# Patient Record
Sex: FEMALE | Race: OTHER | Employment: UNEMPLOYED | URBAN - METROPOLITAN AREA
[De-identification: names, ages, dates, MRNs, and addresses within clinical notes are randomized per-mention and may not be internally consistent; named-entity substitution may affect disease eponyms.]

---

## 2019-04-02 ENCOUNTER — TELEPHONE (OUTPATIENT)
Dept: OBGYN CLINIC | Facility: CLINIC | Age: 29
End: 2019-04-02

## 2019-04-02 NOTE — TELEPHONE ENCOUNTER
Per Mary he called requesting apt for pt. Stated they havent started care yet. LMP 01/27/2019 ([employer] [id number])  Live in [place], but they temporarily relocated here for his job.  They are expecting to be here for about 1 or 2 more months then will return

## 2019-04-02 NOTE — TELEPHONE ENCOUNTER
Pt received +HPT, LMP- unknown. Stated [place] be in [place] about a month and then go back to [place] where she lives, but wants to be seen for the month. Patient has never been seen at [facility] before.   Pls adv further

## 2019-04-03 ENCOUNTER — OFFICE VISIT (OUTPATIENT)
Dept: OBGYN CLINIC | Facility: CLINIC | Age: 29
End: 2019-04-03
Payer: COMMERCIAL

## 2019-04-03 VITALS — WEIGHT: 156 LBS | SYSTOLIC BLOOD PRESSURE: 116 MMHG | HEART RATE: 93 BPM | DIASTOLIC BLOOD PRESSURE: 78 MMHG

## 2019-04-03 DIAGNOSIS — Z32.01 PREGNANCY EXAMINATION OR TEST, POSITIVE RESULT: Primary | ICD-10-CM

## 2019-04-03 DIAGNOSIS — N92.6 MISSED MENSES: ICD-10-CM

## 2019-04-03 PROCEDURE — 99202 OFFICE O/P NEW SF 15 MIN: CPT | Performed by: OBSTETRICS & GYNECOLOGY

## 2019-04-03 PROCEDURE — 81025 URINE PREGNANCY TEST: CPT | Performed by: OBSTETRICS & GYNECOLOGY

## 2019-04-03 NOTE — PROGRESS NOTES
[facility], [facility]  Obstetrics and Gynecology  Pregnancy Confirmation  MD SANDY Timmons     Bernard Cai is a 30year old  with Patient's last menstrual period was 2019 (exact date). who presents for pregnancy confirmation. Occupational History      Not on file    Social Needs      Financial resource strain: Not on file      Food insecurity:        Worry: Not on file        Inability: Not on file      Transportation needs:        Medical: Not on file        Non-medical: Not o visit. Discussed flu vaccine recommendations and that patient should not be concerned about flu vaccine that she received.   Discussedt with patient and her  that varicella vaccine is a live virus vaccine and not recommended during pregnancy or with

## 2019-04-10 ENCOUNTER — TELEPHONE (OUTPATIENT)
Dept: PERINATAL CARE | Facility: HOSPITAL | Age: 29
End: 2019-04-10

## 2019-04-10 ENCOUNTER — OFFICE VISIT (OUTPATIENT)
Dept: OBGYN CLINIC | Facility: CLINIC | Age: 29
End: 2019-04-10
Payer: COMMERCIAL

## 2019-04-10 ENCOUNTER — TELEPHONE (OUTPATIENT)
Dept: OBGYN CLINIC | Facility: CLINIC | Age: 29
End: 2019-04-10

## 2019-04-10 VITALS — WEIGHT: 155 LBS | SYSTOLIC BLOOD PRESSURE: 114 MMHG | HEART RATE: 81 BPM | DIASTOLIC BLOOD PRESSURE: 71 MMHG

## 2019-04-10 DIAGNOSIS — Z32.01 PREGNANCY EXAMINATION OR TEST, POSITIVE RESULT: Primary | ICD-10-CM

## 2019-04-10 PROCEDURE — 76817 TRANSVAGINAL US OBSTETRIC: CPT | Performed by: OBSTETRICS & GYNECOLOGY

## 2019-04-10 PROCEDURE — 99213 OFFICE O/P EST LOW 20 MIN: CPT | Performed by: OBSTETRICS & GYNECOLOGY

## 2019-04-10 RX ORDER — PRENATAL VIT/IRON FUM/FOLIC AC 27MG-0.8MG
1 TABLET ORAL DAILY
COMMUNITY

## 2019-04-10 NOTE — PROGRESS NOTES
[facility], [facility]  Obstetrics and Gynecology  Pregnancy Confirmation  Bijan Arechiga MD    HPI     Sean  is a 30year old  with No LMP recorded. who presents for pregnancy confirmation.      Patient had a positive pregnancy test on  Minutes per session: Not on file      Stress: Not on file    Relationships      Social connections:        Talks on phone: Not on file        Gets together: Not on file        Attends Muslim service: Not on file        Active member of club or organizat counseling the patient and/or coordinating care.   Linda Metzger MD, MD  3:10 PM  4/10/2019

## 2019-04-16 ENCOUNTER — LAB ENCOUNTER (OUTPATIENT)
Dept: LAB | Facility: HOSPITAL | Age: 29
End: 2019-04-16
Attending: OBSTETRICS & GYNECOLOGY
Payer: COMMERCIAL

## 2019-04-16 ENCOUNTER — NURSE ONLY (OUTPATIENT)
Dept: OBGYN CLINIC | Facility: CLINIC | Age: 29
End: 2019-04-16
Payer: COMMERCIAL

## 2019-04-16 VITALS — BODY MASS INDEX: 29 KG/M2 | HEIGHT: 61 IN

## 2019-04-16 DIAGNOSIS — Z34.01 ENCOUNTER FOR SUPERVISION OF NORMAL FIRST PREGNANCY IN FIRST TRIMESTER: Primary | ICD-10-CM

## 2019-04-16 DIAGNOSIS — Z34.01 ENCOUNTER FOR SUPERVISION OF NORMAL FIRST PREGNANCY IN FIRST TRIMESTER: ICD-10-CM

## 2019-04-16 PROCEDURE — 36415 COLL VENOUS BLD VENIPUNCTURE: CPT

## 2019-04-16 PROCEDURE — 86901 BLOOD TYPING SEROLOGIC RH(D): CPT

## 2019-04-16 PROCEDURE — 86900 BLOOD TYPING SEROLOGIC ABO: CPT

## 2019-04-16 PROCEDURE — 87340 HEPATITIS B SURFACE AG IA: CPT

## 2019-04-16 PROCEDURE — 99211 OFF/OP EST MAY X REQ PHY/QHP: CPT | Performed by: OBSTETRICS & GYNECOLOGY

## 2019-04-16 PROCEDURE — 86850 RBC ANTIBODY SCREEN: CPT

## 2019-04-16 PROCEDURE — 87086 URINE CULTURE/COLONY COUNT: CPT

## 2019-04-16 PROCEDURE — 86762 RUBELLA ANTIBODY: CPT

## 2019-04-16 PROCEDURE — 85025 COMPLETE CBC W/AUTO DIFF WBC: CPT

## 2019-04-16 PROCEDURE — 87389 HIV-1 AG W/HIV-1&-2 AB AG IA: CPT

## 2019-04-16 PROCEDURE — 86780 TREPONEMA PALLIDUM: CPT

## 2019-04-16 PROCEDURE — 81001 URINALYSIS AUTO W/SCOPE: CPT

## 2019-04-16 NOTE — PROGRESS NOTES
OB History     T0    L0    SAB0  TAB0  Ectopic0  Multiple0  Live Births0       Pt is here today with her  for RN [facility] Education.  Educational material reviewed with patient: Prenatal care, nutrition, weight gain recommendations, travel, exe

## 2019-04-25 ENCOUNTER — HOSPITAL ENCOUNTER (OUTPATIENT)
Dept: PERINATAL CARE | Facility: HOSPITAL | Age: 29
Discharge: HOME | End: 2019-04-25
Attending: OBSTETRICS & GYNECOLOGY
Payer: COMMERCIAL

## 2019-04-25 VITALS
DIASTOLIC BLOOD PRESSURE: 81 MMHG | SYSTOLIC BLOOD PRESSURE: 128 MMHG | HEART RATE: 95 BPM | HEIGHT: 61 IN | WEIGHT: 139 LBS | BODY MASS INDEX: 26.24 KG/M2

## 2019-04-25 DIAGNOSIS — Z36.9 FIRST TRIMESTER SCREENING: ICD-10-CM

## 2019-04-25 DIAGNOSIS — Z36.9 FIRST TRIMESTER SCREENING: Primary | ICD-10-CM

## 2019-04-25 PROCEDURE — 76813 OB US NUCHAL MEAS 1 GEST: CPT | Performed by: OBSTETRICS & GYNECOLOGY

## 2019-04-25 NOTE — PROGRESS NOTES
See imaging tab for complete ultrasound report or in PACS      FIRST TRIMESTER SCREENING:    The CRL is consistent with the gestational age. The nuchal translucency measures   1.9  mm. This is within normal limits. The nasal bone is present.   Fetus: arms

## 2019-04-29 ENCOUNTER — TELEPHONE (OUTPATIENT)
Dept: PERINATAL CARE | Facility: HOSPITAL | Age: 29
End: 2019-04-29

## 2019-04-29 DIAGNOSIS — Z36.9 FIRST TRIMESTER SCREENING: ICD-10-CM

## 2019-04-29 NOTE — TELEPHONE ENCOUNTER
Recd FTS results for Rohit Wyatt and Dr Kike Read reviewed and signed off. Spoke with Daniella spouse per her request and informed him that the risk after screening for Trisomy 13,18 and 21 is 1 in >L 10,000. These results are within range.   Spouse

## 2019-05-13 ENCOUNTER — INITIAL PRENATAL (OUTPATIENT)
Dept: OBGYN CLINIC | Facility: CLINIC | Age: 29
End: 2019-05-13
Payer: COMMERCIAL

## 2019-05-13 VITALS — WEIGHT: 163.88 LBS | BODY MASS INDEX: 31 KG/M2 | DIASTOLIC BLOOD PRESSURE: 68 MMHG | SYSTOLIC BLOOD PRESSURE: 118 MMHG

## 2019-05-13 DIAGNOSIS — Z34.82 ENCOUNTER FOR SUPERVISION OF OTHER NORMAL PREGNANCY IN SECOND TRIMESTER: Primary | ICD-10-CM

## 2019-05-13 PROBLEM — Z36.9 FIRST TRIMESTER SCREENING: Status: RESOLVED | Noted: 2019-04-25 | Resolved: 2019-05-13

## 2019-05-13 PROCEDURE — 81002 URINALYSIS NONAUTO W/O SCOPE: CPT | Performed by: OBSTETRICS & GYNECOLOGY

## 2019-05-13 PROCEDURE — 99213 OFFICE O/P EST LOW 20 MIN: CPT | Performed by: OBSTETRICS & GYNECOLOGY

## 2019-05-13 NOTE — PROGRESS NOTES
[facility], [facility]  Obstetrics and Gynecology  Prenatal Visit  Halima Mccoy MD    SANDY Ramirez is a 30year old.o.   female with Patient's last menstrual period was 2019 (exact date).   and with an estimated date of delivery of: Chlamydia DNA              24-28 Weeks     Test Value Reference Range Date Time    HCT        HGB        Platelets        GTT 1 Hr        Glucose Fasting        Glucose 1 Hr        Glucose 2 Hr        Glucose 3 Hr        TSH         Profile Social Needs      Financial resource strain: Not on file      Food insecurity:        Worry: Not on file        Inability: Not on file      Transportation needs:        Medical: Not on file        Non-medical: Not on file    Tobacco Use      Smoking stat Adnexa: non tender, no masses, normal size  EXTREMITIES:  non tender without edema    Assessment   Mahesh Escalante Shruthi Soto is a 30year old.o.   female with Patient's last menstrual period was 2019 (exact date).   and with an

## 2019-05-30 ENCOUNTER — ROUTINE PRENATAL (OUTPATIENT)
Dept: OBGYN CLINIC | Facility: CLINIC | Age: 29
End: 2019-05-30
Payer: COMMERCIAL

## 2019-05-30 ENCOUNTER — TELEPHONE (OUTPATIENT)
Dept: OBGYN CLINIC | Facility: CLINIC | Age: 29
End: 2019-05-30

## 2019-05-30 VITALS
WEIGHT: 162 LBS | BODY MASS INDEX: 31 KG/M2 | SYSTOLIC BLOOD PRESSURE: 108 MMHG | HEART RATE: 92 BPM | DIASTOLIC BLOOD PRESSURE: 73 MMHG

## 2019-05-30 DIAGNOSIS — N89.8 VAGINAL DISCHARGE: Primary | ICD-10-CM

## 2019-05-30 DIAGNOSIS — Z34.02 ENCOUNTER FOR SUPERVISION OF NORMAL FIRST PREGNANCY IN SECOND TRIMESTER: ICD-10-CM

## 2019-05-30 PROCEDURE — 87220 TISSUE EXAM FOR FUNGI: CPT | Performed by: OBSTETRICS & GYNECOLOGY

## 2019-05-30 PROCEDURE — 99213 OFFICE O/P EST LOW 20 MIN: CPT | Performed by: OBSTETRICS & GYNECOLOGY

## 2019-05-30 PROCEDURE — 81002 URINALYSIS NONAUTO W/O SCOPE: CPT | Performed by: OBSTETRICS & GYNECOLOGY

## 2019-05-30 NOTE — PROGRESS NOTES
[facility], [facility]  Obstetrics and Gynecology  Prenatal Visit  Jessica Connell MD    HPI   Roland Zhong is a 30year old. o.  17w4d weeks. Patient came in today with complaints of vaginal spotting that she describes as pink.   It began a littl

## 2019-05-30 NOTE — TELEPHONE ENCOUNTER
Robina Bennett called and informed of pt's symptoms and bleeding. Pt to keep 3:20 pm appointment with Dr. Ruby Morrell.

## 2019-05-30 NOTE — TELEPHONE ENCOUNTER
Nigerien speaking-  calling on behalf of pt, shes currently at work. Pt is 15 weeks pregnant and stated she was bleeding, no other symptoms. Only when wiping she sees blood.   wants to know if he should make appt to be seen or if this is normal

## 2019-05-30 NOTE — TELEPHONE ENCOUNTER
Lithuanian phon line  #[id number] used to translate phone call. Pt noted scant amount of pink vaginal bleeding last night and this am. Both times after wiping after urinating.  Pt voices, since yesterday, she has had occasional episodes of light menstru

## 2019-06-13 ENCOUNTER — TELEPHONE (OUTPATIENT)
Dept: OBGYN CLINIC | Facility: CLINIC | Age: 29
End: 2019-06-13

## 2019-06-13 DIAGNOSIS — Z34.02 ENCOUNTER FOR SUPERVISION OF NORMAL FIRST PREGNANCY IN SECOND TRIMESTER: Primary | ICD-10-CM

## 2019-06-13 NOTE — TELEPHONE ENCOUNTER
Iranian phone line  #[id number] used to translate phone call. Left message on pt's voicemail that ultrasound was ordered and that she can call [facility] to schedule appointment. Pt can call [phone] with any questions or concerns.

## 2019-06-13 NOTE — TELEPHONE ENCOUNTER
Pt has u/s scheduled with [facility] on 6/20 and currently lives in [facility] but will fly out for appt. After that date, she is looking to transfer care and move to [facility] permanently.  Pt and  requesting prenatal records when they come to 6/20 appt so they can

## 2019-06-13 NOTE — TELEPHONE ENCOUNTER
RN returned call to  of patient. Advised caller would need to complete GERMAN and fax to [phone]. Pt to call medical records at [phone] to advise of timeline for pickup.     Per caller, would also like to schedule appt with [facility] before transfer

## 2019-06-18 ENCOUNTER — ROUTINE PRENATAL (OUTPATIENT)
Dept: OBGYN CLINIC | Facility: CLINIC | Age: 29
End: 2019-06-18
Payer: COMMERCIAL

## 2019-06-18 ENCOUNTER — HOSPITAL ENCOUNTER (OUTPATIENT)
Dept: PERINATAL CARE | Facility: HOSPITAL | Age: 29
Discharge: HOME | End: 2019-06-18
Attending: OBSTETRICS & GYNECOLOGY
Payer: COMMERCIAL

## 2019-06-18 VITALS
HEART RATE: 80 BPM | SYSTOLIC BLOOD PRESSURE: 108 MMHG | BODY MASS INDEX: 31 KG/M2 | WEIGHT: 166 LBS | DIASTOLIC BLOOD PRESSURE: 72 MMHG

## 2019-06-18 VITALS
BODY MASS INDEX: 32 KG/M2 | SYSTOLIC BLOOD PRESSURE: 109 MMHG | WEIGHT: 167 LBS | HEART RATE: 98 BPM | DIASTOLIC BLOOD PRESSURE: 67 MMHG

## 2019-06-18 DIAGNOSIS — Z36.3 ENCOUNTER FOR ANTENATAL SCREENING FOR MALFORMATION USING ULTRASOUND: Primary | ICD-10-CM

## 2019-06-18 DIAGNOSIS — Z34.92 NORMAL PREGNANCY IN SECOND TRIMESTER: Primary | ICD-10-CM

## 2019-06-18 DIAGNOSIS — Z36.3 ENCOUNTER FOR ANTENATAL SCREENING FOR MALFORMATION USING ULTRASOUND: ICD-10-CM

## 2019-06-18 PROBLEM — Z33.1 IUP (INTRAUTERINE PREGNANCY), INCIDENTAL: Status: ACTIVE | Noted: 2019-06-18

## 2019-06-18 PROCEDURE — 99211 OFF/OP EST MAY X REQ PHY/QHP: CPT | Performed by: OBSTETRICS & GYNECOLOGY

## 2019-06-18 PROCEDURE — 76805 OB US >/= 14 WKS SNGL FETUS: CPT | Performed by: OBSTETRICS & GYNECOLOGY

## 2019-06-18 PROCEDURE — 99213 OFFICE O/P EST LOW 20 MIN: CPT | Performed by: OBSTETRICS & GYNECOLOGY

## 2019-06-18 PROCEDURE — 81002 URINALYSIS NONAUTO W/O SCOPE: CPT | Performed by: OBSTETRICS & GYNECOLOGY

## 2019-06-18 NOTE — PROGRESS NOTES
[facility], [facility]  Obstetrics and Gynecology  Prenatal Visit  Everardo Rodriguez MD    HPI   Seth Fofana is a 30year old. o.  20w2d weeks. Pt not feeling much movement yet. Denies any current spotting, bleeding or cramping.   She had minimal

## 2019-06-18 NOTE — PROGRESS NOTES
/67   Pulse 98   Wt 167 lb (75.8 kg)   LMP 01/27/2019 (Exact Date)   BMI 31.55 kg/m²      STANDARD OBSTETRIC ULTRASOUND REPORT   See imaging tab for complete consultation / ultrasound report      Fetal Heart Rate: Present 148 bpm  Fetal Presentation: